# Patient Record
Sex: FEMALE | Race: BLACK OR AFRICAN AMERICAN | NOT HISPANIC OR LATINO | URBAN - METROPOLITAN AREA
[De-identification: names, ages, dates, MRNs, and addresses within clinical notes are randomized per-mention and may not be internally consistent; named-entity substitution may affect disease eponyms.]

---

## 2023-08-03 ENCOUNTER — HOSPITAL ENCOUNTER (OUTPATIENT)
Facility: HOSPITAL | Age: 53
Discharge: HOME | End: 2023-08-05
Attending: EMERGENCY MEDICINE | Admitting: SURGERY
Payer: MEDICARE

## 2023-08-03 DIAGNOSIS — T14.90XA TRAUMA: Primary | ICD-10-CM
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
## 2023-08-03 DIAGNOSIS — S81.851A DOG BITE OF RIGHT LOWER LEG, INITIAL ENCOUNTER: ICD-10-CM

## 2023-08-03 LAB
BASOPHILS # BLD AUTO: 0.05 X10(3)/MCL
BASOPHILS NFR BLD AUTO: 0.4 %
EOSINOPHIL # BLD AUTO: 0.07 X10(3)/MCL (ref 0–0.9)
EOSINOPHIL NFR BLD AUTO: 0.6 %
ERYTHROCYTE [DISTWIDTH] IN BLOOD BY AUTOMATED COUNT: 13.9 % (ref 11.5–17)
HCT VFR BLD AUTO: 37.2 % (ref 37–47)
HGB BLD-MCNC: 12 G/DL (ref 12–16)
IMM GRANULOCYTES # BLD AUTO: 0.04 X10(3)/MCL (ref 0–0.04)
IMM GRANULOCYTES NFR BLD AUTO: 0.3 %
LACTATE SERPL-SCNC: 1.8 MMOL/L (ref 0.5–2.2)
LYMPHOCYTES # BLD AUTO: 1.63 X10(3)/MCL (ref 0.6–4.6)
LYMPHOCYTES NFR BLD AUTO: 13.3 %
MCH RBC QN AUTO: 31.7 PG (ref 27–31)
MCHC RBC AUTO-ENTMCNC: 32.3 G/DL (ref 33–36)
MCV RBC AUTO: 98.2 FL (ref 80–94)
MONOCYTES # BLD AUTO: 0.76 X10(3)/MCL (ref 0.1–1.3)
MONOCYTES NFR BLD AUTO: 6.2 %
NEUTROPHILS # BLD AUTO: 9.7 X10(3)/MCL (ref 2.1–9.2)
NEUTROPHILS NFR BLD AUTO: 79.2 %
NRBC BLD AUTO-RTO: 0 %
PLATELET # BLD AUTO: 173 X10(3)/MCL (ref 130–400)
PMV BLD AUTO: 10.3 FL (ref 7.4–10.4)
RBC # BLD AUTO: 3.79 X10(6)/MCL (ref 4.2–5.4)
WBC # SPEC AUTO: 12.25 X10(3)/MCL (ref 4.5–11.5)

## 2023-08-03 PROCEDURE — 90471 IMMUNIZATION ADMIN: CPT | Performed by: EMERGENCY MEDICINE

## 2023-08-03 PROCEDURE — 83605 ASSAY OF LACTIC ACID: CPT | Performed by: EMERGENCY MEDICINE

## 2023-08-03 PROCEDURE — 80053 COMPREHEN METABOLIC PANEL: CPT | Performed by: EMERGENCY MEDICINE

## 2023-08-03 PROCEDURE — 63600175 PHARM REV CODE 636 W HCPCS: Performed by: EMERGENCY MEDICINE

## 2023-08-03 PROCEDURE — 96375 TX/PRO/DX INJ NEW DRUG ADDON: CPT

## 2023-08-03 PROCEDURE — 90715 TDAP VACCINE 7 YRS/> IM: CPT | Performed by: EMERGENCY MEDICINE

## 2023-08-03 PROCEDURE — 82077 ASSAY SPEC XCP UR&BREATH IA: CPT | Performed by: EMERGENCY MEDICINE

## 2023-08-03 PROCEDURE — 85610 PROTHROMBIN TIME: CPT | Performed by: EMERGENCY MEDICINE

## 2023-08-03 PROCEDURE — 96365 THER/PROPH/DIAG IV INF INIT: CPT

## 2023-08-03 PROCEDURE — 85730 THROMBOPLASTIN TIME PARTIAL: CPT | Performed by: EMERGENCY MEDICINE

## 2023-08-03 PROCEDURE — 99285 EMERGENCY DEPT VISIT HI MDM: CPT

## 2023-08-03 PROCEDURE — 85025 COMPLETE CBC W/AUTO DIFF WBC: CPT | Performed by: EMERGENCY MEDICINE

## 2023-08-03 RX ORDER — ONDANSETRON 2 MG/ML
4 INJECTION INTRAMUSCULAR; INTRAVENOUS
Status: COMPLETED | OUTPATIENT
Start: 2023-08-03 | End: 2023-08-03

## 2023-08-03 RX ORDER — HYDROMORPHONE HYDROCHLORIDE 2 MG/ML
1 INJECTION, SOLUTION INTRAMUSCULAR; INTRAVENOUS; SUBCUTANEOUS
Status: COMPLETED | OUTPATIENT
Start: 2023-08-03 | End: 2023-08-03

## 2023-08-03 RX ORDER — CEFAZOLIN SODIUM 2 G/50ML
2 SOLUTION INTRAVENOUS
Status: COMPLETED | OUTPATIENT
Start: 2023-08-03 | End: 2023-08-04

## 2023-08-03 RX ADMIN — ONDANSETRON 4 MG: 2 INJECTION INTRAMUSCULAR; INTRAVENOUS at 11:08

## 2023-08-03 RX ADMIN — CEFAZOLIN SODIUM 2 G: 2 SOLUTION INTRAVENOUS at 11:08

## 2023-08-03 RX ADMIN — TETANUS TOXOID, REDUCED DIPHTHERIA TOXOID AND ACELLULAR PERTUSSIS VACCINE, ADSORBED 0.5 ML: 5; 2.5; 8; 8; 2.5 SUSPENSION INTRAMUSCULAR at 11:08

## 2023-08-03 RX ADMIN — HYDROMORPHONE HYDROCHLORIDE 1 MG: 2 INJECTION INTRAMUSCULAR; INTRAVENOUS; SUBCUTANEOUS at 11:08

## 2023-08-04 ENCOUNTER — ANESTHESIA (OUTPATIENT)
Dept: SURGERY | Facility: HOSPITAL | Age: 53
End: 2023-08-04
Payer: MEDICARE

## 2023-08-04 ENCOUNTER — ANESTHESIA EVENT (OUTPATIENT)
Dept: SURGERY | Facility: HOSPITAL | Age: 53
End: 2023-08-04
Payer: MEDICARE

## 2023-08-04 LAB
ALBUMIN SERPL-MCNC: 4.1 G/DL (ref 3.5–5)
ALBUMIN/GLOB SERPL: 1.3 RATIO (ref 1.1–2)
ALP SERPL-CCNC: 68 UNIT/L (ref 40–150)
ALT SERPL-CCNC: 13 UNIT/L (ref 0–55)
APTT PPP: 29.1 SECONDS (ref 23.2–33.7)
AST SERPL-CCNC: 16 UNIT/L (ref 5–34)
BILIRUBIN DIRECT+TOT PNL SERPL-MCNC: 0.3 MG/DL
BUN SERPL-MCNC: 20.6 MG/DL (ref 9.8–20.1)
CALCIUM SERPL-MCNC: 9 MG/DL (ref 8.4–10.2)
CHLORIDE SERPL-SCNC: 107 MMOL/L (ref 98–107)
CO2 SERPL-SCNC: 24 MMOL/L (ref 22–29)
CREAT SERPL-MCNC: 0.91 MG/DL (ref 0.55–1.02)
ETHANOL SERPL-MCNC: <10 MG/DL
GFR SERPLBLD CREATININE-BSD FMLA CKD-EPI: >60 MLS/MIN/1.73/M2
GLOBULIN SER-MCNC: 3.1 GM/DL (ref 2.4–3.5)
GLUCOSE SERPL-MCNC: 97 MG/DL (ref 74–100)
INR PPP: 1
POTASSIUM SERPL-SCNC: 4.5 MMOL/L (ref 3.5–5.1)
PROT SERPL-MCNC: 7.2 GM/DL (ref 6.4–8.3)
PROTHROMBIN TIME: 13.3 SECONDS (ref 12.5–14.5)
SODIUM SERPL-SCNC: 139 MMOL/L (ref 136–145)

## 2023-08-04 PROCEDURE — 96376 TX/PRO/DX INJ SAME DRUG ADON: CPT

## 2023-08-04 PROCEDURE — 71000033 HC RECOVERY, INTIAL HOUR: Performed by: STUDENT IN AN ORGANIZED HEALTH CARE EDUCATION/TRAINING PROGRAM

## 2023-08-04 PROCEDURE — G0378 HOSPITAL OBSERVATION PER HR: HCPCS

## 2023-08-04 PROCEDURE — 36000704 HC OR TIME LEV I 1ST 15 MIN: Performed by: STUDENT IN AN ORGANIZED HEALTH CARE EDUCATION/TRAINING PROGRAM

## 2023-08-04 PROCEDURE — 37000008 HC ANESTHESIA 1ST 15 MINUTES: Performed by: STUDENT IN AN ORGANIZED HEALTH CARE EDUCATION/TRAINING PROGRAM

## 2023-08-04 PROCEDURE — 96376 TX/PRO/DX INJ SAME DRUG ADON: CPT | Mod: 59

## 2023-08-04 PROCEDURE — 25000003 PHARM REV CODE 250: Performed by: STUDENT IN AN ORGANIZED HEALTH CARE EDUCATION/TRAINING PROGRAM

## 2023-08-04 PROCEDURE — 63600175 PHARM REV CODE 636 W HCPCS: Performed by: STUDENT IN AN ORGANIZED HEALTH CARE EDUCATION/TRAINING PROGRAM

## 2023-08-04 PROCEDURE — 37000009 HC ANESTHESIA EA ADD 15 MINS: Performed by: STUDENT IN AN ORGANIZED HEALTH CARE EDUCATION/TRAINING PROGRAM

## 2023-08-04 PROCEDURE — 96372 THER/PROPH/DIAG INJ SC/IM: CPT | Mod: 59 | Performed by: STUDENT IN AN ORGANIZED HEALTH CARE EDUCATION/TRAINING PROGRAM

## 2023-08-04 PROCEDURE — 96375 TX/PRO/DX INJ NEW DRUG ADDON: CPT

## 2023-08-04 PROCEDURE — 27000221 HC OXYGEN, UP TO 24 HOURS

## 2023-08-04 PROCEDURE — D9220A PRA ANESTHESIA: ICD-10-PCS | Mod: CRNA,,, | Performed by: NURSE ANESTHETIST, CERTIFIED REGISTERED

## 2023-08-04 PROCEDURE — D9220A PRA ANESTHESIA: ICD-10-PCS | Mod: ANES,,, | Performed by: STUDENT IN AN ORGANIZED HEALTH CARE EDUCATION/TRAINING PROGRAM

## 2023-08-04 PROCEDURE — 96367 TX/PROPH/DG ADDL SEQ IV INF: CPT | Mod: 59

## 2023-08-04 PROCEDURE — 25000003 PHARM REV CODE 250

## 2023-08-04 PROCEDURE — 36000705 HC OR TIME LEV I EA ADD 15 MIN: Performed by: STUDENT IN AN ORGANIZED HEALTH CARE EDUCATION/TRAINING PROGRAM

## 2023-08-04 PROCEDURE — 63600175 PHARM REV CODE 636 W HCPCS

## 2023-08-04 PROCEDURE — D9220A PRA ANESTHESIA: Mod: ANES,,, | Performed by: STUDENT IN AN ORGANIZED HEALTH CARE EDUCATION/TRAINING PROGRAM

## 2023-08-04 PROCEDURE — 96366 THER/PROPH/DIAG IV INF ADDON: CPT | Mod: 59

## 2023-08-04 PROCEDURE — 25000003 PHARM REV CODE 250: Performed by: NURSE ANESTHETIST, CERTIFIED REGISTERED

## 2023-08-04 PROCEDURE — D9220A PRA ANESTHESIA: Mod: CRNA,,, | Performed by: NURSE ANESTHETIST, CERTIFIED REGISTERED

## 2023-08-04 PROCEDURE — 63600175 PHARM REV CODE 636 W HCPCS: Performed by: NURSE ANESTHETIST, CERTIFIED REGISTERED

## 2023-08-04 RX ORDER — PHENYLEPHRINE HCL IN 0.9% NACL 1 MG/10 ML
SYRINGE (ML) INTRAVENOUS
Status: DISCONTINUED | OUTPATIENT
Start: 2023-08-04 | End: 2023-08-04

## 2023-08-04 RX ORDER — SODIUM CHLORIDE 0.9 % (FLUSH) 0.9 %
10 SYRINGE (ML) INJECTION
Status: DISCONTINUED | OUTPATIENT
Start: 2023-08-04 | End: 2023-08-05 | Stop reason: HOSPADM

## 2023-08-04 RX ORDER — HYDROMORPHONE HYDROCHLORIDE 2 MG/ML
0.4 INJECTION, SOLUTION INTRAMUSCULAR; INTRAVENOUS; SUBCUTANEOUS EVERY 5 MIN PRN
Status: DISCONTINUED | OUTPATIENT
Start: 2023-08-04 | End: 2023-08-04

## 2023-08-04 RX ORDER — TALC
6 POWDER (GRAM) TOPICAL NIGHTLY PRN
Status: DISCONTINUED | OUTPATIENT
Start: 2023-08-04 | End: 2023-08-05 | Stop reason: HOSPADM

## 2023-08-04 RX ORDER — METHOCARBAMOL 500 MG/1
500 TABLET, FILM COATED ORAL 4 TIMES DAILY
Status: DISCONTINUED | OUTPATIENT
Start: 2023-08-04 | End: 2023-08-05 | Stop reason: HOSPADM

## 2023-08-04 RX ORDER — HYDROMORPHONE HYDROCHLORIDE 2 MG/ML
INJECTION, SOLUTION INTRAMUSCULAR; INTRAVENOUS; SUBCUTANEOUS
Status: DISCONTINUED | OUTPATIENT
Start: 2023-08-04 | End: 2023-08-04

## 2023-08-04 RX ORDER — DEXAMETHASONE SODIUM PHOSPHATE 4 MG/ML
INJECTION, SOLUTION INTRA-ARTICULAR; INTRALESIONAL; INTRAMUSCULAR; INTRAVENOUS; SOFT TISSUE
Status: DISCONTINUED | OUTPATIENT
Start: 2023-08-04 | End: 2023-08-04

## 2023-08-04 RX ORDER — HYDROMORPHONE HYDROCHLORIDE 2 MG/ML
INJECTION, SOLUTION INTRAMUSCULAR; INTRAVENOUS; SUBCUTANEOUS
Status: DISPENSED
Start: 2023-08-04 | End: 2023-08-05

## 2023-08-04 RX ORDER — HYDROMORPHONE HYDROCHLORIDE 2 MG/ML
0.25 INJECTION, SOLUTION INTRAMUSCULAR; INTRAVENOUS; SUBCUTANEOUS ONCE
Status: COMPLETED | OUTPATIENT
Start: 2023-08-04 | End: 2023-08-04

## 2023-08-04 RX ORDER — GABAPENTIN 300 MG/1
300 CAPSULE ORAL 3 TIMES DAILY
Status: DISCONTINUED | OUTPATIENT
Start: 2023-08-04 | End: 2023-08-05 | Stop reason: HOSPADM

## 2023-08-04 RX ORDER — ONDANSETRON 4 MG/1
8 TABLET, ORALLY DISINTEGRATING ORAL EVERY 8 HOURS PRN
Status: DISCONTINUED | OUTPATIENT
Start: 2023-08-04 | End: 2023-08-05 | Stop reason: HOSPADM

## 2023-08-04 RX ORDER — SODIUM CHLORIDE, SODIUM LACTATE, POTASSIUM CHLORIDE, CALCIUM CHLORIDE 600; 310; 30; 20 MG/100ML; MG/100ML; MG/100ML; MG/100ML
INJECTION, SOLUTION INTRAVENOUS CONTINUOUS
Status: DISCONTINUED | OUTPATIENT
Start: 2023-08-04 | End: 2023-08-05 | Stop reason: HOSPADM

## 2023-08-04 RX ORDER — ACETAMINOPHEN 325 MG/1
650 TABLET ORAL EVERY 4 HOURS PRN
Status: DISCONTINUED | OUTPATIENT
Start: 2023-08-04 | End: 2023-08-05 | Stop reason: HOSPADM

## 2023-08-04 RX ORDER — LIDOCAINE HYDROCHLORIDE 20 MG/ML
INJECTION INTRAVENOUS
Status: DISCONTINUED | OUTPATIENT
Start: 2023-08-04 | End: 2023-08-04

## 2023-08-04 RX ORDER — GLYCOPYRROLATE 0.2 MG/ML
INJECTION INTRAMUSCULAR; INTRAVENOUS
Status: DISCONTINUED | OUTPATIENT
Start: 2023-08-04 | End: 2023-08-04

## 2023-08-04 RX ORDER — LIDOCAINE HYDROCHLORIDE 10 MG/ML
1 INJECTION, SOLUTION EPIDURAL; INFILTRATION; INTRACAUDAL; PERINEURAL ONCE AS NEEDED
Status: DISCONTINUED | OUTPATIENT
Start: 2023-08-04 | End: 2023-08-05 | Stop reason: HOSPADM

## 2023-08-04 RX ORDER — ACETAMINOPHEN 10 MG/ML
INJECTION, SOLUTION INTRAVENOUS
Status: DISCONTINUED | OUTPATIENT
Start: 2023-08-04 | End: 2023-08-04

## 2023-08-04 RX ORDER — OXYCODONE HYDROCHLORIDE 5 MG/1
5 TABLET ORAL EVERY 4 HOURS PRN
Status: DISCONTINUED | OUTPATIENT
Start: 2023-08-04 | End: 2023-08-05 | Stop reason: HOSPADM

## 2023-08-04 RX ORDER — OXYCODONE HYDROCHLORIDE 10 MG/1
10 TABLET ORAL EVERY 4 HOURS PRN
Status: DISCONTINUED | OUTPATIENT
Start: 2023-08-04 | End: 2023-08-05 | Stop reason: HOSPADM

## 2023-08-04 RX ORDER — ONDANSETRON 2 MG/ML
INJECTION INTRAMUSCULAR; INTRAVENOUS
Status: DISCONTINUED | OUTPATIENT
Start: 2023-08-04 | End: 2023-08-04

## 2023-08-04 RX ORDER — HYDROMORPHONE HYDROCHLORIDE 2 MG/ML
1 INJECTION, SOLUTION INTRAMUSCULAR; INTRAVENOUS; SUBCUTANEOUS
Status: COMPLETED | OUTPATIENT
Start: 2023-08-04 | End: 2023-08-04

## 2023-08-04 RX ORDER — DIPHENHYDRAMINE HYDROCHLORIDE 50 MG/ML
25 INJECTION INTRAMUSCULAR; INTRAVENOUS
Status: COMPLETED | OUTPATIENT
Start: 2023-08-04 | End: 2023-08-04

## 2023-08-04 RX ORDER — FENTANYL CITRATE 50 UG/ML
INJECTION, SOLUTION INTRAMUSCULAR; INTRAVENOUS
Status: DISCONTINUED | OUTPATIENT
Start: 2023-08-04 | End: 2023-08-04

## 2023-08-04 RX ORDER — ACETAMINOPHEN 325 MG/1
650 TABLET ORAL EVERY 8 HOURS PRN
Status: DISCONTINUED | OUTPATIENT
Start: 2023-08-04 | End: 2023-08-05 | Stop reason: HOSPADM

## 2023-08-04 RX ORDER — SODIUM CHLORIDE 0.9 % (FLUSH) 0.9 %
10 SYRINGE (ML) INJECTION
Status: DISCONTINUED | OUTPATIENT
Start: 2023-08-04 | End: 2023-08-04

## 2023-08-04 RX ORDER — ENOXAPARIN SODIUM 100 MG/ML
40 INJECTION SUBCUTANEOUS EVERY 12 HOURS
Status: DISCONTINUED | OUTPATIENT
Start: 2023-08-04 | End: 2023-08-05 | Stop reason: HOSPADM

## 2023-08-04 RX ORDER — ONDANSETRON 2 MG/ML
4 INJECTION INTRAMUSCULAR; INTRAVENOUS
Status: COMPLETED | OUTPATIENT
Start: 2023-08-04 | End: 2023-08-04

## 2023-08-04 RX ORDER — PROPOFOL 10 MG/ML
INJECTION, EMULSION INTRAVENOUS
Status: DISCONTINUED | OUTPATIENT
Start: 2023-08-04 | End: 2023-08-04

## 2023-08-04 RX ORDER — MIDAZOLAM HYDROCHLORIDE 1 MG/ML
INJECTION INTRAMUSCULAR; INTRAVENOUS
Status: DISCONTINUED | OUTPATIENT
Start: 2023-08-04 | End: 2023-08-04

## 2023-08-04 RX ADMIN — DEXAMETHASONE SODIUM PHOSPHATE 4 MG: 4 INJECTION, SOLUTION INTRA-ARTICULAR; INTRALESIONAL; INTRAMUSCULAR; INTRAVENOUS; SOFT TISSUE at 11:08

## 2023-08-04 RX ADMIN — GABAPENTIN 300 MG: 300 CAPSULE ORAL at 02:08

## 2023-08-04 RX ADMIN — ENOXAPARIN SODIUM 40 MG: 40 INJECTION SUBCUTANEOUS at 08:08

## 2023-08-04 RX ADMIN — METHOCARBAMOL 500 MG: 500 TABLET ORAL at 08:08

## 2023-08-04 RX ADMIN — OXYCODONE HYDROCHLORIDE 10 MG: 10 TABLET ORAL at 08:08

## 2023-08-04 RX ADMIN — SODIUM CHLORIDE, SODIUM GLUCONATE, SODIUM ACETATE, POTASSIUM CHLORIDE AND MAGNESIUM CHLORIDE: 526; 502; 368; 37; 30 INJECTION, SOLUTION INTRAVENOUS at 11:08

## 2023-08-04 RX ADMIN — SODIUM CHLORIDE, POTASSIUM CHLORIDE, SODIUM LACTATE AND CALCIUM CHLORIDE: 600; 310; 30; 20 INJECTION, SOLUTION INTRAVENOUS at 04:08

## 2023-08-04 RX ADMIN — HYDROMORPHONE HYDROCHLORIDE 0.4 MG: 2 INJECTION INTRAMUSCULAR; INTRAVENOUS; SUBCUTANEOUS at 12:08

## 2023-08-04 RX ADMIN — METHOCARBAMOL 500 MG: 500 TABLET ORAL at 02:08

## 2023-08-04 RX ADMIN — ONDANSETRON 4 MG: 2 INJECTION INTRAMUSCULAR; INTRAVENOUS at 02:08

## 2023-08-04 RX ADMIN — HYDROMORPHONE HYDROCHLORIDE 1 MG: 2 INJECTION INTRAMUSCULAR; INTRAVENOUS; SUBCUTANEOUS at 02:08

## 2023-08-04 RX ADMIN — PIPERACILLIN AND TAZOBACTAM 4.5 G: 4; .5 INJECTION, POWDER, LYOPHILIZED, FOR SOLUTION INTRAVENOUS; PARENTERAL at 11:08

## 2023-08-04 RX ADMIN — ACETAMINOPHEN 1000 MG: 10 INJECTION, SOLUTION INTRAVENOUS at 11:08

## 2023-08-04 RX ADMIN — LIDOCAINE HYDROCHLORIDE 80 MG: 20 INJECTION INTRAVENOUS at 11:08

## 2023-08-04 RX ADMIN — ENOXAPARIN SODIUM 40 MG: 40 INJECTION SUBCUTANEOUS at 02:08

## 2023-08-04 RX ADMIN — HYDROMORPHONE HYDROCHLORIDE 1 MG: 2 INJECTION, SOLUTION INTRAMUSCULAR; INTRAVENOUS; SUBCUTANEOUS at 12:08

## 2023-08-04 RX ADMIN — ONDANSETRON 4 MG: 2 INJECTION INTRAMUSCULAR; INTRAVENOUS at 11:08

## 2023-08-04 RX ADMIN — MIDAZOLAM HYDROCHLORIDE 2 MG: 1 INJECTION, SOLUTION INTRAMUSCULAR; INTRAVENOUS at 11:08

## 2023-08-04 RX ADMIN — DIPHENHYDRAMINE HYDROCHLORIDE 25 MG: 50 INJECTION INTRAMUSCULAR; INTRAVENOUS at 02:08

## 2023-08-04 RX ADMIN — Medication 100 MCG: at 11:08

## 2023-08-04 RX ADMIN — HYDROMORPHONE HYDROCHLORIDE 1 MG: 2 INJECTION, SOLUTION INTRAMUSCULAR; INTRAVENOUS; SUBCUTANEOUS at 11:08

## 2023-08-04 RX ADMIN — PIPERACILLIN AND TAZOBACTAM 4.5 G: 4; .5 INJECTION, POWDER, LYOPHILIZED, FOR SOLUTION INTRAVENOUS; PARENTERAL at 04:08

## 2023-08-04 RX ADMIN — GABAPENTIN 300 MG: 300 CAPSULE ORAL at 08:08

## 2023-08-04 RX ADMIN — HYDROMORPHONE HYDROCHLORIDE 0.25 MG: 2 INJECTION INTRAMUSCULAR; INTRAVENOUS; SUBCUTANEOUS at 06:08

## 2023-08-04 RX ADMIN — OXYCODONE HYDROCHLORIDE 10 MG: 10 TABLET ORAL at 10:08

## 2023-08-04 RX ADMIN — METHOCARBAMOL 500 MG: 500 TABLET ORAL at 04:08

## 2023-08-04 RX ADMIN — OXYCODONE HYDROCHLORIDE 10 MG: 10 TABLET ORAL at 04:08

## 2023-08-04 RX ADMIN — FENTANYL CITRATE 100 MCG: 50 INJECTION, SOLUTION INTRAMUSCULAR; INTRAVENOUS at 11:08

## 2023-08-04 RX ADMIN — OXYCODONE HYDROCHLORIDE 10 MG: 10 TABLET ORAL at 03:08

## 2023-08-04 RX ADMIN — GLYCOPYRROLATE 0.2 MG: 0.2 INJECTION INTRAMUSCULAR; INTRAVENOUS at 11:08

## 2023-08-04 RX ADMIN — PROPOFOL 120 MG: 10 INJECTION, EMULSION INTRAVENOUS at 11:08

## 2023-08-04 RX ADMIN — PIPERACILLIN AND TAZOBACTAM 4.5 G: 4; .5 INJECTION, POWDER, LYOPHILIZED, FOR SOLUTION INTRAVENOUS; PARENTERAL at 07:08

## 2023-08-04 NOTE — PLAN OF CARE
Pt tells me she resides at [address] [facility] in [facility] with her boyfriend. The home has 3 steps and a ramp. He has one leg so she plans to dc to her sisters home in [place] and will have family transport her home.   Pt confirms her phone is .   PCP is Jake Lefluer in [place]  Registration contacted with the correct address and PCP  Pt uses no equipment.   Pt going to surgery today. Leg is painful and pt is needing assist to lift leg into bed.   Will follow to see how she gets up post surgery and if equipment is needed. Referral sent to [facility] as pt has [email or web address] [facility]. Pt is unsure of her sisters address in [place] but will know it later and she confirms she can be reached by phone for confirmation.  Pt tells me the dog that bit her was her boyfriends [place] bull . ED notes that pt knows she can file police report as there is no animal control option per notes  Will follow for equipment if needed ( crutches vs walker) and to notify [facility] of discharge. [facility]; waiting for more notes to attach. Will need form for home health and also any DME

## 2023-08-04 NOTE — H&P
Acute Care Surgery   History and Physical    Patient Name: Olga Beaver  YOB: 1970  Date: 08/04/2023 1:51 AM  Date of Admission: 8/3/2023  HD#0  POD#* No surgery found *    PRESENTING HISTORY   Chief Complaint/Reason for Admission: Dog Bite to RLE    History of Present Illness:  Patient tripped and fell outside while trying to let her dog in, however after falling the dog grabbed her leg leaving a large wound the her RLE. Tetanus and Ancef given in ED, wound was washed at bedside. Patient has hx of several heart stents and takes Plavix.     Review of Systems:  12 point ROS negative except as stated in HPI    PAST HISTORY:   Past medical history:  No past medical history on file.    Past surgical history:  No past surgical history on file.    Family history:  No family history on file.    Social history:  Social History     Socioeconomic History    Marital status:      Social History     Tobacco Use   Smoking Status Not on file   Smokeless Tobacco Not on file      Social History     Substance and Sexual Activity   Alcohol Use Not on file        MEDICATIONS & ALLERGIES:     No current facility-administered medications on file prior to encounter.     No current outpatient medications on file prior to encounter.       Allergies: Review of patient's allergies indicates:  No Known Allergies    Scheduled Meds:   diphenhydrAMINE  25 mg Intravenous ED 1 Time    HYDROmorphone  1 mg Intravenous ED 1 Time    ondansetron  4 mg Intravenous ED 1 Time       Continuous Infusions:    PRN Meds:    OBJECTIVE:   Vital Signs:  VITAL SIGNS: 24 HR MIN & MAX LAST   Temp  Min: 98 °F (36.7 °C)  Max: 98 °F (36.7 °C)  98 °F (36.7 °C)   BP  Min: 126/77  Max: 148/82  126/77    Pulse  Min: 83  Max: 99  83    Resp  Min: 16  Max: 16  16    SpO2  Min: 95 %  Max: 97 %  96 %      HT:    WT:    BMI:       Intake/output:  Intake/Output - Last 3 Shifts       None          No intake or output data in the 24 hours ending 08/04/23 0151   "    Physical Exam:  General: Well developed, well nourished, no acute distress  HEENT: Normocephalic, atraumatic, PERRL  CV: RR  Resp: NWOB  GI:  Abdomen soft, non-tender, non-distended, no guarding, no rebound, normoactive bowel sounds, no hollie  MSK: No muscle atrophy, cyanosis, peripheral edema, moving all extremities spontaneously  Skin/wounds:  Large wound to RLE anterior/lateral surface with extension through subcutaneous and muscle layers but not involving any bone nor major vasculature - good palpable pulses to BLE  Neuro:  CNII-XII grossly intact, alert and oriented to person, place, and time    Labs:  Troponin:  No results for input(s): "TROPONINI" in the last 72 hours.  CBC:  Recent Labs     08/03/23  2318   WBC 12.25*   RBC 3.79*   HGB 12.0   HCT 37.2      MCV 98.2*   MCH 31.7*   MCHC 32.3*     CMP:  Recent Labs     08/03/23  2318   CALCIUM 9.0   ALBUMIN 4.1      K 4.5   CO2 24   BUN 20.6*   CREATININE 0.91   ALKPHOS 68   ALT 13   AST 16   BILITOT 0.3     Lactic Acid:  No results for input(s): "LACTATE" in the last 72 hours.  ETOH:  Recent Labs     08/03/23  2318   ETHANOL <10.0        ASSESSMENT & PLAN:    RLE dog bite    - Admit to Surgery  - OR in am for washout and debridement   - IV abx and IVF  - MMPC   - Lovenox      Petra Rodriguez MD  [employer] General Surgery, PGY4  [facility]      "

## 2023-08-04 NOTE — ANESTHESIA PREPROCEDURE EVALUATION
08/04/2023  Olga Beaver is a 52 y.o., female.    S/p dog bite now with LE wound  12 / 37 / 173k  Na 139, K 4.5, Cr 0.91    Pre-op Assessment    I have reviewed the Patient Summary Reports.     I have reviewed the Nursing Notes. I have reviewed the NPO Status.   I have reviewed the Medications.     Review of Systems  Anesthesia Hx:   Denies Personal Hx of Anesthesia complications.   Cardiovascular:   Past MI CAD   Multiple prior MI per patient, last one > 4 years ago, denies angina, no signs or symptoms of heart failure   Pulmonary:  Pulmonary Normal    Hepatic/GI:   GERD, well controlled    Neurological:  Neurology Normal    Endocrine:  Endocrine Normal    Psych:  Psychiatric Normal           Physical Exam  General: Well nourished, Cooperative and Alert    Airway:  Mallampati: II   Mouth Opening: Normal  TM Distance: Normal  Tongue: Normal    Dental:  Edentulous        Anesthesia Plan  Type of Anesthesia, risks & benefits discussed:    Anesthesia Type: Gen Supraglottic Airway  Intra-op Monitoring Plan: Standard ASA Monitors  Post Op Pain Control Plan: multimodal analgesia  Induction:  IV  Informed Consent: Informed consent signed with the Patient and all parties understand the risks and agree with anesthesia plan.  All questions answered.   ASA Score: 3  Day of Surgery Review of History & Physical: H&P Update referred to the surgeon/provider.    Ready For Surgery From Anesthesia Perspective.     .

## 2023-08-04 NOTE — NURSING
Nurses Note -- 4 Eyes      8/4/2023   5:33 AM      Skin assessed during: Admit      [] No Altered Skin Integrity Present    []Prevention Measures Documented      [x] Yes- Altered Skin Integrity Present or Discovered   [x] LDA Added if Not in Epic (Describe Wound)   [x] New Altered Skin Integrity was Present on Admit and Documented in LDA   [x] Wound Image Taken    Wound Care Consulted? Yes    Attending Nurse:  Tere Ramirez RN/Staff Member:   Mary Jasso

## 2023-08-04 NOTE — ANESTHESIA POSTPROCEDURE EVALUATION
Anesthesia Post Evaluation    Patient: Olga Beaver    Procedure(s) Performed: Procedure(s) (LRB):  INCISION AND DRAINAGE, LOWER EXTREMITY (Right)    Final Anesthesia Type: general      Patient location during evaluation: PACU  Patient participation: Yes- Able to Participate  Level of consciousness: awake and alert  Post-procedure vital signs: reviewed and stable  Pain management: adequate  Airway patency: patent    PONV status at discharge: No PONV  Anesthetic complications: no      Respiratory status: unassisted  Hydration status: euvolemic  Follow-up not needed.          Vitals Value Taken Time   /70 08/04/23 1502   Temp 36.4 °C (97.5 °F) 08/04/23 1222   Pulse 62 08/04/23 1549   Resp 14 08/04/23 1252   SpO2 95 % 08/04/23 1549   Vitals shown include unvalidated device data.      Event Time   Out of Recovery 08/04/2023 12:54:48         Pain/Murtaza Score: Pain Rating Prior to Med Admin: 6 (8/4/2023 12:41 PM)  Pain Rating Post Med Admin: 2 (8/4/2023 11:08 AM)  Murtaza Score: 9 (8/4/2023 12:30 PM)

## 2023-08-04 NOTE — ED NOTES
Spoke to Clair (dispatcher) at [facility]'s Office regarding contacting animal control for their [place]. She stated that they do not have animal control and advised that if the pt wished to file a report against the dog or the dog's owner she could call them when she is discharged from the hospital.

## 2023-08-04 NOTE — ANESTHESIA PROCEDURE NOTES
Intubation    Date/Time: 8/4/2023 11:23 AM    Performed by: Merry Calderón CRNA  Authorized by: Sheldon Faulkner MD    Intubation:     Induction:  Intravenous    Intubated:  Postinduction    Mask Ventilation:  Easy mask    Attempts:  1    Attempted By:  CRNA    Method of Intubation:  Blind intubation    Difficult Airway Encountered?: No      Complications:  None    Airway Device:  Supraglottic airway/LMA    Airway Device Size:  4.0    Style/Cuff Inflation:  Cuffed (inflated to minimal occlusive pressure)    Secured at:  The teeth    Placement Verified By:  Capnometry    Complicating Factors:  None    Findings Post-Intubation:  BS equal bilateral

## 2023-08-04 NOTE — TRANSFER OF CARE
"Anesthesia Transfer of Care Note    Patient: Olga Beaver    Procedure(s) Performed: Procedure(s) (LRB):  INCISION AND DRAINAGE, LOWER EXTREMITY (Right)    Patient location: PACU    Anesthesia Type: general    Transport from OR: Transported from OR on room air with adequate spontaneous ventilation    Post pain: adequate analgesia    Post assessment: no apparent anesthetic complications and tolerated procedure well    Post vital signs: stable    Level of consciousness: responds to stimulation    Nausea/Vomiting: no nausea/vomiting    Complications: none    Transfer of care protocol was followed      Last vitals:   Visit Vitals  /73   Pulse 90   Temp 36.4 °C (97.5 °F)   Resp (!) 21   Ht 5' 7" (1.702 m)   Wt 60 kg (132 lb 3.3 oz)   SpO2 100%   BMI 20.71 kg/m²     "

## 2023-08-04 NOTE — TERTIARY TRAUMA SURVEY NOTE
TERTIARY TRAUMA SURVEY (TTS)    List Injuries Identified to Date:   1. Dog bite RLE    []Problems list reviewed  List Operations and Procedures:   1. None (plan for washout/closure today)    No past surgical history on file.    Incidental findings:   1. none    Past Medical History:   1. CAD s/p multiple PCI, on plavix    Active Ambulatory Problems     Diagnosis Date Noted    No Active Ambulatory Problems     Resolved Ambulatory Problems     Diagnosis Date Noted    No Resolved Ambulatory Problems     No Additional Past Medical History     No past medical history on file.    Tertiary Physical Exam:     Physical Exam  Vitals reviewed.   Constitutional:       Appearance: Normal appearance. She is normal weight.   HENT:      Head: Normocephalic.   Eyes:      Extraocular Movements: Extraocular movements intact.   Cardiovascular:      Rate and Rhythm: Normal rate.      Pulses: Normal pulses.      Comments: 2+ R DP  Pulmonary:      Effort: Pulmonary effort is normal.   Abdominal:      General: Abdomen is flat. There is no distension.      Palpations: Abdomen is soft.      Tenderness: There is no abdominal tenderness.   Musculoskeletal:         General: Tenderness (RLE) present. Normal range of motion.      Cervical back: Normal range of motion.   Skin:     General: Skin is warm and dry.      Comments: RLE wounds with Kerlix, ACE bandage   Neurological:      General: No focal deficit present.      Mental Status: She is alert and oriented to person, place, and time.   Psychiatric:         Mood and Affect: Mood normal.         Imaging Review:     Imaging Results              X-Ray Tibia Fibula 2 View Right (In process)                      Lab Review:   CBC:  Recent Labs   Lab Result Units 08/03/23  2318   WBC x10(3)/mcL 12.25*   RBC x10(6)/mcL 3.79*   Hgb g/dL 12.0   Hct % 37.2   Platelet x10(3)/mcL 173   MCV fL 98.2*   MCH pg 31.7*   MCHC g/dL 32.3*       CMP:  Recent Labs   Lab Result Units 08/03/23  2318   Calcium  "Level Total mg/dL 9.0   Albumin Level g/dL 4.1   Sodium Level mmol/L 139   Potassium Level mmol/L 4.5   Carbon Dioxide mmol/L 24   Blood Urea Nitrogen mg/dL 20.6*   Creatinine mg/dL 0.91   Alkaline Phosphatase unit/L 68   Alanine Aminotransferase unit/L 13   Aspartate Aminotransferase unit/L 16   Bilirubin Total mg/dL 0.3       Troponin:  No results for input(s): "TROPONINI" in the last 2160 hours.    ETOH:  Recent Labs     08/03/23  2318   ETHANOL <10.0        Urine Drug Screen:  No results for input(s): "COCAINE", "OPIATE", "BARBITURATE", "AMPHETAMINE", "FENTANYL", "CANNABINOIDS", "MDMA" in the last 72 hours.    Invalid input(s): "BENZODIAZEPINE", "PHENCYCLIDINE"   Plan:   52F with dog bite to RLE.    - OR today for washout/closure  - f/u xray read  - MM pain  - NPO, IVF  - anticipate discharge after surgery  - continue zosyn, 7 days augmentin upon discharge    Elkin Bell MD  Trauma Surgery  8/4/2023 6:33 AM      "

## 2023-08-04 NOTE — BRIEF OP NOTE
[facility]  Brief Operative Note    SUMMARY     Surgery Date: 8/4/2023     Surgeon(s) and Role:     * Stuart Elliott MD - Primary    Assisting Surgeon: Elkin Robledo MD - Resident    Pre-op Diagnosis:  Trauma [T14.90XA]    Post-op Diagnosis:  Post-Op Diagnosis Codes:     * Trauma [T14.90XA]    Procedure(s) (LRB):  INCISION AND DRAINAGE, LOWER EXTREMITY (Right)    Anesthesia: General/MAC    Operative Findings: Region of non-viable skin overlying anterior lower leg wounds. Healthy appearing underlying tissue. Anterior and posterior Wounds debrided and hemostasis achieved. Wrapped with xeroform, gauze, Kerlex, and ACE.    Estimated Blood Loss: * No values recorded between 8/4/2023 11:44 AM and 8/4/2023 12:17 PM *    Estimated Blood Loss has not been documented. EBL = 10.         Specimens:   Specimen (24h ago, onward)      None            [id number]

## 2023-08-04 NOTE — ED PROVIDER NOTES
Encounter Date: 8/3/2023       History     Chief Complaint   Patient presents with    Animal Bite     Dog bite to r lower leg - on plavix, multiple large lacerations to ant/lat r lower leg wrapped by aasi - bleeding controled at this time, pms intact     Pt attacked by pitbull just pta, significant lacerations over rle, nvi grossly, good dp pulse, intact sensory      Review of patient's allergies indicates:  No Known Allergies  Past Medical History:   Diagnosis Date    Coronary artery disease      Past Surgical History:   Procedure Laterality Date    INCISION AND DRAINAGE, LOWER EXTREMITY Right 8/4/2023    Procedure: INCISION AND DRAINAGE, LOWER EXTREMITY;  Surgeon: Stuart Elliott MD;  Location: [facility];  Service: General;  Laterality: Right;     No family history on file.     Review of Systems   Constitutional:  Negative for chills and fever.   Skin:  Positive for wound.   Neurological:  Negative for numbness.       Physical Exam     Initial Vitals [08/03/23 2245]   BP Pulse Resp Temp SpO2   (!) 148/82 86 16 98 °F (36.7 °C) 97 %      MAP       --         Physical Exam    Constitutional: She appears well-developed and well-nourished.   HENT:   Head: Normocephalic and atraumatic.   Eyes: Pupils are equal, round, and reactive to light.   Neck: Neck supple.   Normal range of motion.  Cardiovascular:  Normal rate and regular rhythm.           Pulmonary/Chest: Breath sounds normal.   Abdominal: Abdomen is soft. There is no abdominal tenderness.   Musculoskeletal:      Cervical back: Normal range of motion and neck supple.      Comments: Several large lacs involving the right leg, see images     Neurological: She is alert and oriented to person, place, and time.   Skin: Skin is warm and dry.   Psychiatric: She has a normal mood and affect. Her behavior is normal. Judgment and thought content normal.        Media Information     Media Information         Media Information    File Link    Scan on 8/3/2023 11:03 PM by  Marilynn Vasquez RN: Posterior R lower leg        Key Information    Document ID File Type Document Type Description   [id number] Image Photographs Posterior R lower leg     Import Information    Attached At Date Time User Dept   Encounter Level 8/3/2023 11:03 PM Marilynn Vasquez RN [facility] Emergency Department     Encounter    Hospital Encounter on 8/3/23     File Link    Scan on 8/3/2023 11:03 PM by Marilynn Vasquez RN: R leg        Trinh Information    Document ID File Type Document Type Description   [id number] Image Photographs R leg     Import Information    Attached At Date Time User Dept   Encounter Level 8/3/2023 11:03 PM Marilynn Vasquez RN [facility] Emergency Department     Encounter    Hospital Encounter on 8/3/23      Media Information    File Link    Scan on 8/3/2023 11:03 PM by Marilynn Vasquez RN: R leg        Trinh Information    Document ID File Type Document Type Description   [id number] Image Photographs R leg     Import Information    Attached At Date Time User Dept   Encounter Level 8/3/2023 11:03 PM Marilynn Vasquez RN [facility] Emergency Department     Encounter    Hospital Encounter on 8/3/23     File Link    Scan on 8/3/2023 11:03 PM by Marilynn Vasquez RN: R leg        Trinh Information    Document ID File Type Document Type Description   [id number] Image Photographs R leg     Import Information    Attached At Date Time User Dept   Encounter Level 8/3/2023 11:03 PM Marilynn Vasquez RN [facility] Emergency Department     Encounter    Hospital Encounter on 8/3/23       ED Course   Procedures  Labs Reviewed   COMPREHENSIVE METABOLIC PANEL - Abnormal; Notable for the following components:       Result Value    Blood Urea Nitrogen 20.6 (*)     All other components within normal limits   CBC WITH DIFFERENTIAL - Abnormal; Notable for the following components:    WBC 12.25 (*)     RBC 3.79 (*)     MCV 98.2 (*)     MCH 31.7 (*)     MCHC 32.3 (*)     Neut # 9.70 (*)     All other components  within normal limits   PROTIME-INR - Normal   APTT - Normal   LACTIC ACID, PLASMA - Normal   ALCOHOL,MEDICAL (ETHANOL) - Normal   CBC W/ AUTO DIFFERENTIAL    Narrative:     The following orders were created for panel order CBC auto differential.  Procedure                               Abnormality         Status                     ---------                               -----------         ------                     CBC with Differential[[id number]]        Abnormal            Final result                 Please view results for these tests on the individual orders.          Imaging Results              X-Ray Tibia Fibula 2 View Right (Final result)  Result time 08/04/23 09:01:32      Final result by Bennie Thurston MD (08/04/23 09:01:32)                   Impression:      No acute osseous findings.      Electronically signed by: Bennie Thurston  Date:    08/04/2023  Time:    09:01               Narrative:    EXAMINATION:  XR TIBIA FIBULA 2 VIEW RIGHT    CLINICAL HISTORY:  Injury, unspecified, initial encounter    COMPARISON:  None    FINDINGS:  Two views right tibia and fibula demonstrate no fracture, dislocation or radiopaque foreign body.  Soft tissue lacerations are noted around the mid shafts of the tibia and fibula.                                    X-Rays:   Independently Interpreted Readings:   Other Readings:  Xr neg for fb or fxr    Medications   HYDROmorphone (PF) (DILAUDID) 2 mg/mL injection (  Not Given 8/4/23 1245)   Tdap (BOOSTRIX) vaccine injection 0.5 mL (0.5 mLs Intramuscular Given 8/3/23 2353)   cefazolin (ANCEF) 2 gram in dextrose 5% 50 mL IVPB (premix) (0 g Intravenous Stopped 8/4/23 0024)   HYDROmorphone (PF) injection 1 mg (1 mg Intravenous Given 8/3/23 2352)   ondansetron injection 4 mg (4 mg Intravenous Given 8/3/23 2352)   HYDROmorphone (PF) injection 1 mg (1 mg Intravenous Given 8/4/23 0211)   ondansetron injection 4 mg (4 mg Intravenous Given 8/4/23 0210)   diphenhydrAMINE injection 25 mg  (25 mg Intravenous Given 8/4/23 0211)   HYDROmorphone (PF) injection 0.25 mg (0.25 mg Intravenous Given 8/4/23 0618)   rabies immune globulin (PF) (HYPERRAB) injection 1,200 Units (1,200 Units Other Given 8/5/23 1501)   rabies vaccine,human diploid injection 2.5 Units (2.5 Units Intramuscular Given 8/5/23 1501)   morphine injection 2 mg (2 mg Intravenous Given 8/5/23 1516)     Medical Decision Making:   History:   I obtained history from: EMS provider.       <> Summary of History: Pt attacked by pitbull, several lacerations over rle  Differential Diagnosis:   Laceration of leg, vascular or nerve injury, tib/fib fracture, foreign body  Clinical Tests:   Lab Tests: Reviewed and Ordered  The following lab test(s) were unremarkable: CBC and CMP  Radiological Study: Ordered and Reviewed  Other:   I have discussed this case with another health care provider.       <> Summary of the Discussion: Case discussed with trauma team, will admit and washout             ED Course as of 08/10/23 0630   Thu Aug 03, 2023   3322 Paged Surgery [MM]      ED Course User Index  [MM] Mariana Bertrand                 Clinical Impression:   Final diagnoses:  [T14.90XA] Trauma (Primary)  [S81.851A, W54.0XXA] Dog bite of right lower leg, initial encounter        ED Disposition Condition    Observation                 Jonatan Engle MD  08/10/23 0630

## 2023-08-05 VITALS
TEMPERATURE: 99 F | HEIGHT: 67 IN | HEART RATE: 81 BPM | BODY MASS INDEX: 20.75 KG/M2 | DIASTOLIC BLOOD PRESSURE: 87 MMHG | SYSTOLIC BLOOD PRESSURE: 148 MMHG | OXYGEN SATURATION: 99 % | WEIGHT: 132.19 LBS | RESPIRATION RATE: 18 BRPM
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
## 2023-08-05 LAB
ALBUMIN SERPL-MCNC: 3.2 G/DL (ref 3.5–5)
ALBUMIN/GLOB SERPL: 1 RATIO (ref 1.1–2)
ALP SERPL-CCNC: 56 UNIT/L (ref 40–150)
ALT SERPL-CCNC: 11 UNIT/L (ref 0–55)
AST SERPL-CCNC: 19 UNIT/L (ref 5–34)
BASOPHILS # BLD AUTO: 0.03 X10(3)/MCL
BASOPHILS # BLD AUTO: 0.04 X10(3)/MCL
BASOPHILS NFR BLD AUTO: 0.3 %
BASOPHILS NFR BLD AUTO: 0.4 %
BILIRUBIN DIRECT+TOT PNL SERPL-MCNC: 0.4 MG/DL
BUN SERPL-MCNC: 20.2 MG/DL (ref 9.8–20.1)
CALCIUM SERPL-MCNC: 8.8 MG/DL (ref 8.4–10.2)
CHLORIDE SERPL-SCNC: 106 MMOL/L (ref 98–107)
CO2 SERPL-SCNC: 25 MMOL/L (ref 22–29)
CREAT SERPL-MCNC: 0.73 MG/DL (ref 0.55–1.02)
EOSINOPHIL # BLD AUTO: 0.09 X10(3)/MCL (ref 0–0.9)
EOSINOPHIL # BLD AUTO: 0.11 X10(3)/MCL (ref 0–0.9)
EOSINOPHIL NFR BLD AUTO: 1 %
EOSINOPHIL NFR BLD AUTO: 1 %
ERYTHROCYTE [DISTWIDTH] IN BLOOD BY AUTOMATED COUNT: 13.6 % (ref 11.5–17)
ERYTHROCYTE [DISTWIDTH] IN BLOOD BY AUTOMATED COUNT: 13.9 % (ref 11.5–17)
GFR SERPLBLD CREATININE-BSD FMLA CKD-EPI: >60 MLS/MIN/1.73/M2
GLOBULIN SER-MCNC: 3.2 GM/DL (ref 2.4–3.5)
GLUCOSE SERPL-MCNC: 113 MG/DL (ref 74–100)
HCT VFR BLD AUTO: 30.3 % (ref 37–47)
HCT VFR BLD AUTO: 31.4 % (ref 37–47)
HGB BLD-MCNC: 10.2 G/DL (ref 12–16)
HGB BLD-MCNC: 9.9 G/DL (ref 12–16)
IMM GRANULOCYTES # BLD AUTO: 0.03 X10(3)/MCL (ref 0–0.04)
IMM GRANULOCYTES # BLD AUTO: 0.04 X10(3)/MCL (ref 0–0.04)
IMM GRANULOCYTES NFR BLD AUTO: 0.3 %
IMM GRANULOCYTES NFR BLD AUTO: 0.4 %
LYMPHOCYTES # BLD AUTO: 1.51 X10(3)/MCL (ref 0.6–4.6)
LYMPHOCYTES # BLD AUTO: 2.44 X10(3)/MCL (ref 0.6–4.6)
LYMPHOCYTES NFR BLD AUTO: 16.4 %
LYMPHOCYTES NFR BLD AUTO: 22.7 %
MCH RBC QN AUTO: 31.3 PG (ref 27–31)
MCH RBC QN AUTO: 31.7 PG (ref 27–31)
MCHC RBC AUTO-ENTMCNC: 31.5 G/DL (ref 33–36)
MCHC RBC AUTO-ENTMCNC: 33.7 G/DL (ref 33–36)
MCV RBC AUTO: 94.1 FL (ref 80–94)
MCV RBC AUTO: 99.4 FL (ref 80–94)
MONOCYTES # BLD AUTO: 0.72 X10(3)/MCL (ref 0.1–1.3)
MONOCYTES # BLD AUTO: 0.73 X10(3)/MCL (ref 0.1–1.3)
MONOCYTES NFR BLD AUTO: 6.8 %
MONOCYTES NFR BLD AUTO: 7.8 %
NEUTROPHILS # BLD AUTO: 6.79 X10(3)/MCL (ref 2.1–9.2)
NEUTROPHILS # BLD AUTO: 7.4 X10(3)/MCL (ref 2.1–9.2)
NEUTROPHILS NFR BLD AUTO: 68.8 %
NEUTROPHILS NFR BLD AUTO: 74.1 %
NRBC BLD AUTO-RTO: 0 %
NRBC BLD AUTO-RTO: 0 %
PLATELET # BLD AUTO: 141 X10(3)/MCL (ref 130–400)
PLATELET # BLD AUTO: 144 X10(3)/MCL (ref 130–400)
PMV BLD AUTO: 10.2 FL (ref 7.4–10.4)
PMV BLD AUTO: 10.6 FL (ref 7.4–10.4)
POTASSIUM SERPL-SCNC: 4.1 MMOL/L (ref 3.5–5.1)
PROT SERPL-MCNC: 6.4 GM/DL (ref 6.4–8.3)
RBC # BLD AUTO: 3.16 X10(6)/MCL (ref 4.2–5.4)
RBC # BLD AUTO: 3.22 X10(6)/MCL (ref 4.2–5.4)
SODIUM SERPL-SCNC: 135 MMOL/L (ref 136–145)
WBC # SPEC AUTO: 10.75 X10(3)/MCL (ref 4.5–11.5)
WBC # SPEC AUTO: 9.18 X10(3)/MCL (ref 4.5–11.5)

## 2023-08-05 PROCEDURE — 90472 IMMUNIZATION ADMIN EACH ADD: CPT

## 2023-08-05 PROCEDURE — 85025 COMPLETE CBC W/AUTO DIFF WBC: CPT

## 2023-08-05 PROCEDURE — 63600175 PHARM REV CODE 636 W HCPCS: Mod: JZ,JG

## 2023-08-05 PROCEDURE — 90675 RABIES VACCINE IM: CPT | Mod: JZ,JG

## 2023-08-05 PROCEDURE — 63600175 PHARM REV CODE 636 W HCPCS: Performed by: STUDENT IN AN ORGANIZED HEALTH CARE EDUCATION/TRAINING PROGRAM

## 2023-08-05 PROCEDURE — 90375 RABIES IG IM/SC: CPT | Mod: JZ,JG

## 2023-08-05 PROCEDURE — 90471 IMMUNIZATION ADMIN: CPT | Mod: JZ

## 2023-08-05 PROCEDURE — 80053 COMPREHEN METABOLIC PANEL: CPT

## 2023-08-05 PROCEDURE — 96366 THER/PROPH/DIAG IV INF ADDON: CPT

## 2023-08-05 PROCEDURE — G0378 HOSPITAL OBSERVATION PER HR: HCPCS

## 2023-08-05 PROCEDURE — 96372 THER/PROPH/DIAG INJ SC/IM: CPT | Performed by: STUDENT IN AN ORGANIZED HEALTH CARE EDUCATION/TRAINING PROGRAM

## 2023-08-05 PROCEDURE — 85025 COMPLETE CBC W/AUTO DIFF WBC: CPT | Mod: 91 | Performed by: STUDENT IN AN ORGANIZED HEALTH CARE EDUCATION/TRAINING PROGRAM

## 2023-08-05 PROCEDURE — 97162 PT EVAL MOD COMPLEX 30 MIN: CPT

## 2023-08-05 PROCEDURE — 25000003 PHARM REV CODE 250

## 2023-08-05 PROCEDURE — 25000003 PHARM REV CODE 250: Performed by: STUDENT IN AN ORGANIZED HEALTH CARE EDUCATION/TRAINING PROGRAM

## 2023-08-05 RX ORDER — MORPHINE SULFATE 4 MG/ML
2 INJECTION, SOLUTION INTRAMUSCULAR; INTRAVENOUS ONCE
Status: COMPLETED | OUTPATIENT
Start: 2023-08-05 | End: 2023-08-05

## 2023-08-05 RX ORDER — FUROSEMIDE 20 MG/1
20 TABLET ORAL DAILY
COMMUNITY

## 2023-08-05 RX ORDER — ESCITALOPRAM OXALATE 20 MG/1
20 TABLET ORAL DAILY
COMMUNITY

## 2023-08-05 RX ORDER — NAPROXEN SODIUM 220 MG/1
81 TABLET, FILM COATED ORAL DAILY
COMMUNITY

## 2023-08-05 RX ORDER — CLONIDINE HYDROCHLORIDE 0.2 MG/1
0.2 TABLET ORAL ONCE
COMMUNITY

## 2023-08-05 RX ORDER — ATORVASTATIN CALCIUM 20 MG/1
20 TABLET, FILM COATED ORAL DAILY
COMMUNITY

## 2023-08-05 RX ORDER — CARVEDILOL 3.12 MG/1
3.12 TABLET ORAL 2 TIMES DAILY
COMMUNITY

## 2023-08-05 RX ORDER — OXYCODONE HYDROCHLORIDE 5 MG/1
5 TABLET ORAL EVERY 6 HOURS PRN
Qty: 15 TABLET | Refills: 0 | Status: SHIPPED | OUTPATIENT
Start: 2023-08-05 | End: 2023-08-10

## 2023-08-05 RX ORDER — CLOPIDOGREL BISULFATE 75 MG/1
75 TABLET ORAL DAILY
COMMUNITY

## 2023-08-05 RX ORDER — AMOXICILLIN AND CLAVULANATE POTASSIUM 875; 125 MG/1; MG/1
1 TABLET, FILM COATED ORAL EVERY 12 HOURS
Qty: 14 TABLET | Refills: 0 | Status: SHIPPED | OUTPATIENT
Start: 2023-08-05 | End: 2023-08-12

## 2023-08-05 RX ORDER — CYPROHEPTADINE HYDROCHLORIDE 4 MG/1
4 TABLET ORAL
COMMUNITY

## 2023-08-05 RX ADMIN — METHOCARBAMOL 500 MG: 500 TABLET ORAL at 09:08

## 2023-08-05 RX ADMIN — METHOCARBAMOL 500 MG: 500 TABLET ORAL at 12:08

## 2023-08-05 RX ADMIN — OXYCODONE HYDROCHLORIDE 5 MG: 5 TABLET ORAL at 12:08

## 2023-08-05 RX ADMIN — OXYCODONE HYDROCHLORIDE 10 MG: 10 TABLET ORAL at 09:08

## 2023-08-05 RX ADMIN — RABIES VIRUS STRAIN PM-1503-3M ANTIGEN (PROPIOLACTONE INACTIVATED) AND WATER 2.5 UNITS: KIT at 03:08

## 2023-08-05 RX ADMIN — PIPERACILLIN AND TAZOBACTAM 4.5 G: 4; .5 INJECTION, POWDER, LYOPHILIZED, FOR SOLUTION INTRAVENOUS; PARENTERAL at 03:08

## 2023-08-05 RX ADMIN — OXYCODONE HYDROCHLORIDE 10 MG: 10 TABLET ORAL at 01:08

## 2023-08-05 RX ADMIN — METHOCARBAMOL 500 MG: 500 TABLET ORAL at 05:08

## 2023-08-05 RX ADMIN — ACETAMINOPHEN 650 MG: 325 TABLET, FILM COATED ORAL at 05:08

## 2023-08-05 RX ADMIN — MORPHINE SULFATE 2 MG: 4 INJECTION INTRAVENOUS at 03:08

## 2023-08-05 RX ADMIN — RABIES IMMUNE GLOBULIN (HUMAN) 1200 UNITS: 300 INJECTION, SOLUTION INFILTRATION; INTRAMUSCULAR at 03:08

## 2023-08-05 RX ADMIN — OXYCODONE HYDROCHLORIDE 10 MG: 10 TABLET ORAL at 05:08

## 2023-08-05 RX ADMIN — ENOXAPARIN SODIUM 40 MG: 40 INJECTION SUBCUTANEOUS at 09:08

## 2023-08-05 RX ADMIN — GABAPENTIN 300 MG: 300 CAPSULE ORAL at 09:08

## 2023-08-05 RX ADMIN — PIPERACILLIN AND TAZOBACTAM 4.5 G: 4; .5 INJECTION, POWDER, LYOPHILIZED, FOR SOLUTION INTRAVENOUS; PARENTERAL at 10:08

## 2023-08-05 NOTE — DISCHARGE SUMMARY
[facility]  Critical Care - Medicine  Discharge Summary      Patient Name: Olga Beaver  MRN: [id number]  Admission Date: 8/3/2023  Hospital Length of Stay: 0 days  Discharge Date and Time:  08/05/2023 2:44 PM  Attending Physician: Stuart Elliott MD   Discharging Provider: Elkin Robledo MD  Primary Care Provider: ZACHARY Vickers MD  Reason for Admission: Dog bite to RLE    HPI: Patient tripped and fell outside while trying to let her dog in, however after falling the dog grabbed her leg leaving a large wound the her RLE. Tetanus and Ancef given in ED, wound was washed at bedside. Patient has hx of several heart stents and takes Plavix.        Procedure(s) (LRB):  INCISION AND DRAINAGE, LOWER EXTREMITY (Right)    Indwelling Lines/Drains at Time of Discharge:   Lines/Drains/Airways       None                   Hospital Course: 52y F was admitted to surgery after ED washout of wound. She was started on IV abx and taken to the OR for formal debridement and washout which she tolerated well. She was then discharged home on HD1 w/HH and a 1 week course of Abx in good health.    Consults (From admission, onward)          Status Ordering Provider     Inpatient consult to Social Work/Case Management  Once        Provider:  (Not yet assigned)    Acknowledged UMANG HU            Significant Imaging:  I have reviewed all pertinent imaging results/findings within the past 24 hours.    Pending Diagnostic Studies:       Procedure Component Value Units Date/Time    Drug Screen, Urine [[id number]]     Order Status: Sent Lab Status: No result     Specimen: Urine     Urinalysis, Reflex to Urine Culture [[id number]]     Order Status: Sent Lab Status: No result     Specimen: Urine           Final Active Diagnoses:    Diagnosis Date Noted POA    PRINCIPAL PROBLEM:  Dog bite [W54.0XXA] 08/05/2023 Unknown      Problems Resolved During this Admission:       Discharged Condition: good    Disposition: Home  "or Self Care    Follow Up:   Follow-up Information       ZACHARY Vickers MD Follow up.    Specialty: Internal Medicine  Contact information:  [place] [id number] [phone]               [facility] Follow up.    Specialty: Home Health Services  Why: This is your health agency.  Contact information:  [place] [address]  [phone]                           Patient Instructions:      WALKER FOR HOME USE     Order Specific Question Answer Comments   Type of Walker: Adult (5'4"-6'6")    With wheels? Yes    Height: 5' 7" (1.702 m)    Weight: 60 kg (132 lb 3.3 oz)    Length of need (1-99 months): 99    Does patient have medical equipment at home? none    Please check all that apply: Patient's condition impairs ambulation.    Please check all that apply: Patient is unable to safely ambulate without equipment.      Change dressing (specify)   Order Comments: Change dressing daily using bacitracin and xeroform gauze followed by a simple gauze dressing. Ok to clean in shower with gentle soap and water, but wound must otherwise stay clean and dry.     Activity as tolerated     Medications:  Reconciled Home Medications:      Medication List        START taking these medications      amoxicillin-clavulanate 875-125mg 875-125 mg per tablet  Commonly known as: AUGMENTIN  Take 1 tablet by mouth every 12 (twelve) hours. for 7 days     oxyCODONE 5 MG immediate release tablet  Commonly known as: ROXICODONE  Take 1 tablet (5 mg total) by mouth every 6 (six) hours as needed for Pain (For breakthrough pain not controlled by OTC medication.).            CONTINUE taking these medications      aspirin 81 MG Chew  Take 81 mg by mouth once daily.     atorvastatin 20 MG tablet  Commonly known as: LIPITOR  Take 20 mg by mouth once daily.     carvediloL 3.125 MG tablet  Commonly known as: COREG  Take 3.125 mg by mouth 2 (two) times daily.     cloNIDine 0.2 MG " tablet  Commonly known as: CATAPRES  Take 0.2 mg by mouth once.     clopidogreL 75 mg tablet  Commonly known as: PLAVIX  Take 75 mg by mouth once daily.     cyproheptadine 4 mg tablet  Commonly known as: PERIACTIN  Take 4 mg by mouth before evening meal.     EScitalopram oxalate 20 MG tablet  Commonly known as: LEXAPRO  Take 20 mg by mouth once daily.     furosemide 20 MG tablet  Commonly known as: LASIX  Take 20 mg by mouth once daily.              Elkin Robledo MD  Critical Care - Medicine  [facility]

## 2023-08-05 NOTE — PLAN OF CARE
Discharge documentation sent to [facility] via [facility]. Notified Tevin. [facility] will see patient on Monday, once auth received. Patient and nuse aware. PT recommended  a RW. Referral faxed to Per. Per declined due to no medical diagnosis. Informed patient. She will purchase out of pocket.  Submitted to [facility] for home health auth.

## 2023-08-05 NOTE — PROGRESS NOTES
"   Trauma Surgery   Progress Note  Admit Date: 8/3/2023  HD#0  POD#1 Day Post-Op    Subjective:   Interval history:  NAEON  Postop day 1 washout and debridement of the right lower extremity  Dressings were taken down, healthy-appearing tissue  Wishes to go home today, awaiting home health set up by case management    Home Meds:   Current Outpatient Medications   Medication Instructions    aspirin 81 mg, Oral, Daily    atorvastatin (LIPITOR) 20 mg, Oral, Daily    carvediloL (COREG) 3.125 mg, Oral, 2 times daily    cloNIDine (CATAPRES) 0.2 mg, Oral, Once    clopidogreL (PLAVIX) 75 mg, Oral, Daily    cyproheptadine (PERIACTIN) 4 mg, Oral, Daily before evening meal    EScitalopram oxalate (LEXAPRO) 20 mg, Oral, Daily    furosemide (LASIX) 20 mg, Oral, Daily      Scheduled Meds:   enoxparin  40 mg Subcutaneous Q12H    gabapentin  300 mg Oral TID    methocarbamoL  500 mg Oral QID    piperacillin-tazobactam (Zosyn) IV (PEDS and ADULTS) (extended infusion is not appropriate)  4.5 g Intravenous Q8H    rabies immune globulin (PF)  20 Units/kg Other Once    rabies vaccine,human diploid  2.5 Units Intramuscular Once     Continuous Infusions:   lactated ringers 100 mL/hr at 08/04/23 0439     PRN Meds:acetaminophen, acetaminophen, LIDOcaine (PF) 10 mg/ml (1%), melatonin, ondansetron, oxyCODONE, oxyCODONE, sodium chloride 0.9%     Objective:     VITAL SIGNS: 24 HR MIN & MAX LAST   Temp  Min: 97.9 °F (36.6 °C)  Max: 98.9 °F (37.2 °C)  98.4 °F (36.9 °C)   BP  Min: 105/65  Max: 163/64  (!) 145/71    Pulse  Min: 60  Max: 80  71    Resp  Min: 18  Max: 20  18    SpO2  Min: 89 %  Max: 100 %  100 %      HT: 5' 7" (170.2 cm)  WT: 60 kg (132 lb 3.3 oz)  BMI: 20.7     Intake/output:  Intake/Output - Last 3 Shifts         08/03 0700  08/04 0659 08/04 0700 08/05 0659 08/05 0700 08/06 0659    P.O.   1320    IV Piggyback  1100     Total Intake(mL/kg)  1100 (18.3) 1320 (22)    Urine (mL/kg/hr)  300 (0.2)     Total Output  300     Net  +800 " "+1320           Urine Occurrence  1 x 2 x            Intake/Output Summary (Last 24 hours) at 8/5/2023 1313  Last data filed at 8/5/2023 1145  Gross per 24 hour   Intake 1320 ml   Output --   Net 1320 ml         Lines/drains/airway:       Peripheral IV - Single Lumen 08/03/23 20 G Left Antecubital (Active)   Site Assessment Clean;Dry;Intact 08/05/23 0800   Extremity Assessment Distal to IV No abnormal discoloration;No redness;No swelling;No warmth 08/05/23 0800   Line Status Flushed;Capped;Saline locked 08/05/23 0800   Dressing Status Clean;Dry;Intact 08/05/23 0800   Dressing Intervention Integrity maintained 08/05/23 0800   Number of days: 2       Physical examination:  Gen: NAD, AAOx3, answering questions appropriately  HEENT: NCAT  CV: RR  Resp: NWOB  Abd: S/NT/ND  Msk: moving all extremities spontaneously and purposefully  Neuro: CN II-XII grossly intact  Skin/wounds:  Right lower extremity dressing clean/dry/intact, neurovascularly intact    Labs:  Renal:  Recent Labs     08/03/23 2318 08/05/23 0714   BUN 20.6* 20.2*   CREATININE 0.91 0.73     Recent Labs     08/03/23 2318   LACTIC 1.8     FEN/GI:  Recent Labs     08/03/23 2318 08/05/23 0714    135*   K 4.5 4.1   CO2 24 25   CALCIUM 9.0 8.8   ALBUMIN 4.1 3.2*   BILITOT 0.3 0.4   AST 16 19   ALKPHOS 68 56   ALT 13 11     Heme:  Recent Labs     08/03/23 2318 08/05/23 0714   HGB 12.0 9.9*   HCT 37.2 31.4*    141   PTT 29.1  --    INR 1.0  --      ID:  Recent Labs     08/03/23 2318 08/05/23 0714   WBC 12.25* 9.18     CBG:  Recent Labs     08/03/23 2318 08/05/23 0714   GLUCOSE 97 113*      No results for input(s): "POCTGLUCOSE" in the last 72 hours.   Cardiovascular:  No results for input(s): "TROPONINI", "CKTOTAL", "CKMB", "BNP" in the last 168 hours.  I have reviewed all pertinent lab results within the past 24 hours.    Imaging:  X-Ray Tibia Fibula 2 View Right   Final Result      No acute osseous findings.         Electronically signed " by: Bennie Thurston   Date:    08/04/2023   Time:    09:01         I have reviewed all pertinent imaging results/findings within the past 24 hours.    Micro/Path/Other:  Microbiology Results (last 7 days)       ** No results found for the last 168 hours. **           Specimen (168h ago, onward)      None             Problems list:  There are no problems to display for this patient.       Assessment & Plan:   Olga Beaver is a 52 year old female w/ RLE dog bite x2 s/p washout and debridement     - Regular diet  - PT today  - Wet to dry dressings   - Home health is being set up by CM  - Rabies vaccination  - Hopeful discharge home with Augmentin       8/5/2023 1:14 PM     The above findings, diagnostics, and treatment plan were discussed with Dr. Stuart Elliott who will follow with further assessments and recommendations. Please call with any questions, concerns, or clinical status changes.

## 2023-08-05 NOTE — PT/OT/SLP EVAL
Physical Therapy Evaluation and Discharge Note    Patient Name:  Olga Beaver   MRN:  [id number]    Recommendations:     Discharge Recommendations: home  Discharge Equipment Recommendations: walker, rolling   Barriers to discharge: None    Assessment:     Olga Beaver is a 52 y.o. female admitted with a medical diagnosis of <principal problem not specified>. .  At this time, patient is functioning at their prior level of function and does not require further acute PT services.     Recent Surgery: Procedure(s) (LRB):  INCISION AND DRAINAGE, LOWER EXTREMITY (Right) 1 Day Post-Op    Plan:     During this hospitalization, patient does not require further acute PT services.  Please re-consult if situation changes.      Subjective     Chief Complaint: Pain in her right leg, is very distraught over the appearance of the wound.  Patient/Family Comments/goals: To be able to go home.   Pain/Comfort:  Pain Rating 1: 8/10  Location - Side 1: Right  Location - Orientation 1: lower  Location 1: leg  Pain Addressed 1: Nurse notified, Reposition, Cessation of Activity  Pain Rating Post-Intervention 1: 9/10    Patients cultural, spiritual, Baptist conflicts given the current situation: no    Living Environment:  Pt will go live with her brother and his wife. The brother's home is a single level house with 3 steps and no rail.   Prior to admission, patients level of function was independent.  Equipment used at home: none.  DME owned (not currently used): none.  Upon discharge, patient will have assistance from her sister-in-law.    Objective:     Communicated with nurse prior to session.  Patient found sitting edge of bed with peripheral IV upon PT entry to room.    General Precautions: Standard,      Orthopedic Precautions:    Braces:    Respiratory Status: Room air  Blood Pressure:     Exams:  RLE ROM: WFL  RLE Strength: WFL except 3+/5 ankle musculature  LLE ROM: WFL  LLE Strength: WFL    Functional Mobility:  Transfers:     Sit to  Stand:  contact guard assistance with rolling walker  Gait: Pt ambulates 100 ft CGA with RW. Pt requires rest breaks 2/2 fatigue.   Stairs:  Pt ascended/descended 3 stair(s) with Rolling Walker and HHA with no handrails with Minimal Assistance.     AM-PAC 6 CLICK MOBILITY  Total Score:        Treatment and Education:      Patient provided with verbal education regarding POC.  Understanding was verbalized.     Patient left sitting edge of bed with all lines intact, call button in reach, and nurse notified.    GOALS:   Multidisciplinary Problems       Physical Therapy Goals       Not on file                    History:     Past Medical History:   Diagnosis Date    Coronary artery disease        No past surgical history on file.    Time Tracking:     PT Received On: 08/05/23  PT Start Time: 1215     PT Stop Time: 1240  PT Total Time (min): 25 min     Billable Minutes: Evaluation 25      08/05/2023

## 2023-08-05 NOTE — PLAN OF CARE
Problem: Adult Inpatient Plan of Care  Goal: Plan of Care Review  Outcome: Ongoing, Progressing  Goal: Patient-Specific Goal (Individualized)  Outcome: Ongoing, Progressing  Goal: Absence of Hospital-Acquired Illness or Injury  Outcome: Ongoing, Progressing  Goal: Optimal Comfort and Wellbeing  Outcome: Ongoing, Progressing  Goal: Readiness for Transition of Care  Outcome: Ongoing, Progressing     Problem: Skin Injury Risk Increased  Goal: Skin Health and Integrity  Outcome: Ongoing, Progressing     Problem: Impaired Wound Healing  Goal: Optimal Wound Healing  Outcome: Ongoing, Progressing     Problem: Pain Acute  Goal: Acceptable Pain Control and Functional Ability  Outcome: Ongoing, Progressing

## 2023-08-05 NOTE — PROGRESS NOTES
Mrs. Beaver verbalized understanding of all discharge instructions, follow-up appointments, wound care, prescription usage, and s/s to be aware of that require immediate medical attention. All questions and concerns were addressed. IV dc'd. She is currently lying in bed, belongings are packed, and she is awaiting transportation home.

## 2023-08-05 NOTE — NURSING
Wound care orders for daily wet-to-dry dressing changes given by BONNY Lara in case management made aware.

## 2023-08-05 NOTE — PLAN OF CARE
Problem: Adult Inpatient Plan of Care  Goal: Plan of Care Review  8/5/2023 1352 by Radhika Johnson RN  Outcome: Ongoing, Progressing  8/5/2023 1111 by Radhika Johnson RN  Outcome: Ongoing, Progressing  Goal: Patient-Specific Goal (Individualized)  8/5/2023 1352 by Radhika Johnson RN  Outcome: Ongoing, Progressing  8/5/2023 1111 by Radhika Johnson RN  Outcome: Ongoing, Progressing  Goal: Absence of Hospital-Acquired Illness or Injury  8/5/2023 1352 by Radhika Johnson RN  Outcome: Ongoing, Progressing  8/5/2023 1111 by Radhika Johnson RN  Outcome: Ongoing, Progressing  Goal: Optimal Comfort and Wellbeing  8/5/2023 1352 by Radhika Johnson RN  Outcome: Ongoing, Progressing  8/5/2023 1111 by Radhika Johnson RN  Outcome: Ongoing, Progressing  Goal: Readiness for Transition of Care  8/5/2023 1352 by Radhika Johnson RN  Outcome: Ongoing, Progressing  8/5/2023 1111 by Radhika Johnson RN  Outcome: Ongoing, Progressing     Problem: Skin Injury Risk Increased  Goal: Skin Health and Integrity  8/5/2023 1352 by Radhika Johnson RN  Outcome: Ongoing, Progressing  8/5/2023 1111 by Radhika Johnson RN  Outcome: Ongoing, Progressing     Problem: Impaired Wound Healing  Goal: Optimal Wound Healing  8/5/2023 1352 by Radhika Johnson RN  Outcome: Ongoing, Progressing  8/5/2023 1111 by Radhika Johnson RN  Outcome: Ongoing, Progressing     Problem: Pain Acute  Goal: Acceptable Pain Control and Functional Ability  8/5/2023 1352 by Radhika Johnson RN  Outcome: Ongoing, Progressing  8/5/2023 1111 by Radhika Johnson RN  Outcome: Ongoing, Progressing

## 2023-08-07 NOTE — OP NOTE
INCISION AND DRAINAGE, LOWER EXTREMITY  Procedure Note    Olga Beaver  8/4/2023      Pre-op Diagnosis: Trauma [T14.90XA]       Post-op Diagnosis: same    Procedure(s):  INCISION AND DRAINAGE, LOWER EXTREMITY    Surgeon(s):  Stuart Elliott MD    Anesthesia: General/MAC    Staff:   Circulator: Linh Donaldson RN; Lydia Nveille RN  Scrub Person: Gonzalo Gilman RN; Andrea Prakash    Estimated Blood Loss: minimal               Specimens: none      Drains: None    Operative Technique:  Risks and benefits were discussed with patient and family at bedside, informed consent signed.  Patient was taken to the OR and placed supine, endotracheal intubation was successful.  The right leg was then prepped and draped in sterile fashion.  A time out was completed to confirm correct patient, procedure, and all staff was in agreement.      The wounds were inspected.  There was some overlying necrotic appearing skin on the anterior lower leg wound.  This was sharply excised.  Additional underlying necrotic appearing adipose tissue was also excised.  The posterior wound was inspected and noted to have a small portion of necrotic appearing skin.  This was sharply excised in a similar fashion.  Both wounds were then thoroughly irrigated.  Hemostasis was achieved with Bovie electrocautery.  Wounds were then dressed with xeroform, gauze, abd pads, kerlex and ace wrap.  Patient tolerated procedure well, was extubated and transferred to recovery in stable condition.      SURGEON:  Stuart Elliott MD    Date: 8/4/2023  Time: 06:45 PM

## 2023-08-07 NOTE — PLAN OF CARE
Evie at [facility] confirms they h ave talked with pt and are waiting on people's health to confirm. She has the wound care orders.

## 2023-08-14 ENCOUNTER — TELEPHONE (OUTPATIENT)
Dept: MEDSURG UNIT | Facility: HOSPITAL | Age: 53
End: 2023-08-14
Payer: MEDICARE

## 2023-08-14 NOTE — TELEPHONE ENCOUNTER
[facility] - Post discharge call    Spoke to pt and she is seeing wound care doctor and they are discussing putting a wound vac and referring to a plastic surgeon closer to the pt. She lives in [place] but moving to [place] soon. She says the wound looks good and not having any issues. Enc her to call if needs and she verbalized understanding.

## 2023-08-29 ENCOUNTER — EXTERNAL HOME HEALTH (OUTPATIENT)
Dept: HOME HEALTH SERVICES | Facility: HOSPITAL | Age: 53
End: 2023-08-29
Payer: MEDICARE

## 2023-09-13 ENCOUNTER — DOCUMENT SCAN (OUTPATIENT)
Dept: HOME HEALTH SERVICES | Facility: HOSPITAL | Age: 53
End: 2023-09-13
Payer: MEDICARE

## 2023-10-05 ENCOUNTER — DOCUMENT SCAN (OUTPATIENT)
Dept: HOME HEALTH SERVICES | Facility: HOSPITAL | Age: 53
End: 2023-10-05
Payer: MEDICARE

## 2023-10-07 PROCEDURE — G0179 PR HOME HEALTH MD RECERTIFICATION: ICD-10-PCS | Mod: ,,, | Performed by: NURSE PRACTITIONER

## 2023-10-07 PROCEDURE — G0179 MD RECERTIFICATION HHA PT: HCPCS | Mod: ,,, | Performed by: NURSE PRACTITIONER

## 2023-10-19 ENCOUNTER — EXTERNAL HOME HEALTH (OUTPATIENT)
Dept: HOME HEALTH SERVICES | Facility: HOSPITAL | Age: 53
End: 2023-10-19
Payer: MEDICARE

## (undated) DEVICE — ELECTRODE PATIENT RETURN DISP

## (undated) DEVICE — BANDAGE GAUZE COT STRL 4.5X4.1

## (undated) DEVICE — DRAPE FLUID WARMER ORS 44X44IN

## (undated) DEVICE — TRAY SKIN SCRUB WET PREMIUM

## (undated) DEVICE — DRESSING XEROFORM GAUZE 5X9

## (undated) DEVICE — Device

## (undated) DEVICE — GLOVE PROTEXIS LTX MICRO  7.5

## (undated) DEVICE — GAUZE SPONGE 4X4 12PLY

## (undated) DEVICE — KIT SURGICAL TURNOVER

## (undated) DEVICE — SOL NACL IRR 1000ML BTL

## (undated) DEVICE — CULTSWAB+ AMIES W/O CHARC SNG

## (undated) DEVICE — SWAB CULTURETTE SINGLE

## (undated) DEVICE — PAD ABD 8X10 STERILE

## (undated) DEVICE — KIT DRAIN WOUND RND SPRNG RESV